# Patient Record
Sex: FEMALE | Race: ASIAN | NOT HISPANIC OR LATINO
[De-identification: names, ages, dates, MRNs, and addresses within clinical notes are randomized per-mention and may not be internally consistent; named-entity substitution may affect disease eponyms.]

---

## 2018-01-26 PROBLEM — Z00.00 ENCOUNTER FOR PREVENTIVE HEALTH EXAMINATION: Status: ACTIVE | Noted: 2018-01-26

## 2018-01-29 ENCOUNTER — APPOINTMENT (OUTPATIENT)
Dept: NEUROLOGY | Facility: CLINIC | Age: 53
End: 2018-01-29
Payer: COMMERCIAL

## 2018-01-29 PROCEDURE — 95909 NRV CNDJ TST 5-6 STUDIES: CPT

## 2019-10-02 ENCOUNTER — APPOINTMENT (OUTPATIENT)
Dept: SURGERY | Facility: CLINIC | Age: 54
End: 2019-10-02
Payer: COMMERCIAL

## 2019-10-02 PROCEDURE — 99205K: CUSTOM

## 2019-10-03 ENCOUNTER — RESULT REVIEW (OUTPATIENT)
Age: 54
End: 2019-10-03

## 2019-11-25 ENCOUNTER — APPOINTMENT (OUTPATIENT)
Dept: THORACIC SURGERY | Facility: CLINIC | Age: 54
End: 2019-11-25
Payer: COMMERCIAL

## 2019-11-25 VITALS
WEIGHT: 126 LBS | HEIGHT: 64 IN | BODY MASS INDEX: 21.51 KG/M2 | OXYGEN SATURATION: 97 % | HEART RATE: 70 BPM | SYSTOLIC BLOOD PRESSURE: 115 MMHG | RESPIRATION RATE: 17 BRPM | DIASTOLIC BLOOD PRESSURE: 80 MMHG

## 2019-11-25 DIAGNOSIS — Z85.3 PERSONAL HISTORY OF MALIGNANT NEOPLASM OF BREAST: ICD-10-CM

## 2019-11-25 PROCEDURE — 99205 OFFICE O/P NEW HI 60 MIN: CPT

## 2019-11-26 ENCOUNTER — OUTPATIENT (OUTPATIENT)
Dept: OUTPATIENT SERVICES | Facility: HOSPITAL | Age: 54
LOS: 1 days | End: 2019-11-26

## 2019-11-26 VITALS
RESPIRATION RATE: 16 BRPM | WEIGHT: 126.1 LBS | OXYGEN SATURATION: 98 % | HEART RATE: 77 BPM | DIASTOLIC BLOOD PRESSURE: 70 MMHG | HEIGHT: 64 IN | TEMPERATURE: 97 F | SYSTOLIC BLOOD PRESSURE: 106 MMHG

## 2019-11-26 DIAGNOSIS — R59.1 GENERALIZED ENLARGED LYMPH NODES: ICD-10-CM

## 2019-11-26 DIAGNOSIS — Z90.13 ACQUIRED ABSENCE OF BILATERAL BREASTS AND NIPPLES: Chronic | ICD-10-CM

## 2019-11-26 DIAGNOSIS — Z87.898 PERSONAL HISTORY OF OTHER SPECIFIED CONDITIONS: ICD-10-CM

## 2019-11-26 LAB
ALBUMIN SERPL ELPH-MCNC: 4.2 G/DL — SIGNIFICANT CHANGE UP (ref 3.3–5)
ALP SERPL-CCNC: 87 U/L — SIGNIFICANT CHANGE UP (ref 40–120)
ALT FLD-CCNC: 17 U/L — SIGNIFICANT CHANGE UP (ref 4–33)
ANION GAP SERPL CALC-SCNC: 11 MMO/L — SIGNIFICANT CHANGE UP (ref 7–14)
AST SERPL-CCNC: 17 U/L — SIGNIFICANT CHANGE UP (ref 4–32)
BILIRUB SERPL-MCNC: < 0.2 MG/DL — LOW (ref 0.2–1.2)
BLD GP AB SCN SERPL QL: NEGATIVE — SIGNIFICANT CHANGE UP
BUN SERPL-MCNC: 17 MG/DL — SIGNIFICANT CHANGE UP (ref 7–23)
CALCIUM SERPL-MCNC: 9.5 MG/DL — SIGNIFICANT CHANGE UP (ref 8.4–10.5)
CHLORIDE SERPL-SCNC: 99 MMOL/L — SIGNIFICANT CHANGE UP (ref 98–107)
CO2 SERPL-SCNC: 27 MMOL/L — SIGNIFICANT CHANGE UP (ref 22–31)
CREAT SERPL-MCNC: 0.52 MG/DL — SIGNIFICANT CHANGE UP (ref 0.5–1.3)
GLUCOSE SERPL-MCNC: 121 MG/DL — HIGH (ref 70–99)
HCT VFR BLD CALC: 36.3 % — SIGNIFICANT CHANGE UP (ref 34.5–45)
HGB BLD-MCNC: 11.8 G/DL — SIGNIFICANT CHANGE UP (ref 11.5–15.5)
MCHC RBC-ENTMCNC: 31.3 PG — SIGNIFICANT CHANGE UP (ref 27–34)
MCHC RBC-ENTMCNC: 32.5 % — SIGNIFICANT CHANGE UP (ref 32–36)
MCV RBC AUTO: 96.3 FL — SIGNIFICANT CHANGE UP (ref 80–100)
NRBC # FLD: 0 K/UL — SIGNIFICANT CHANGE UP (ref 0–0)
PLATELET # BLD AUTO: 341 K/UL — SIGNIFICANT CHANGE UP (ref 150–400)
PMV BLD: 9.4 FL — SIGNIFICANT CHANGE UP (ref 7–13)
POTASSIUM SERPL-MCNC: 3.7 MMOL/L — SIGNIFICANT CHANGE UP (ref 3.5–5.3)
POTASSIUM SERPL-SCNC: 3.7 MMOL/L — SIGNIFICANT CHANGE UP (ref 3.5–5.3)
PROT SERPL-MCNC: 7.5 G/DL — SIGNIFICANT CHANGE UP (ref 6–8.3)
RBC # BLD: 3.77 M/UL — LOW (ref 3.8–5.2)
RBC # FLD: 12.8 % — SIGNIFICANT CHANGE UP (ref 10.3–14.5)
RH IG SCN BLD-IMP: POSITIVE — SIGNIFICANT CHANGE UP
SODIUM SERPL-SCNC: 137 MMOL/L — SIGNIFICANT CHANGE UP (ref 135–145)
WBC # BLD: 7.72 K/UL — SIGNIFICANT CHANGE UP (ref 3.8–10.5)
WBC # FLD AUTO: 7.72 K/UL — SIGNIFICANT CHANGE UP (ref 3.8–10.5)

## 2019-11-26 RX ORDER — SODIUM CHLORIDE 9 MG/ML
1000 INJECTION, SOLUTION INTRAVENOUS
Refills: 0 | Status: DISCONTINUED | OUTPATIENT
Start: 2019-12-05 | End: 2020-01-05

## 2019-11-26 NOTE — H&P PST ADULT - NSANTHOSAYNRD_GEN_A_CORE
No. MARIANA screening performed.  STOP BANG Legend: 0-2 = LOW Risk; 3-4 = INTERMEDIATE Risk; 5-8 = HIGH Risk

## 2019-11-26 NOTE — H&P PST ADULT - NEUROLOGICAL DETAILS
normal strength/alert and oriented x 3/responds to pain/responds to verbal commands/sensation intact

## 2019-11-26 NOTE — H&P PST ADULT - ATTENDING COMMENTS
h/o breast cancer with pet positive R paratracheal LN, plan Flex bronch/med, and mediport placement per Dr. Serene Alegria from [facility].

## 2019-11-26 NOTE — ASSESSMENT
[FreeTextEntry1] : Ms. ADOLFO CHATMAN, 54 year old female, never smoker, w/ hx of bilateral breast CA s/p bilateral mastectomy with axillary LNs resection on 11/01/2019, path of the left mastectomy revealed [id number] and right mastectomy revealed [id number], referred to Hem/Onc Dr. Timoteo Jang for chemo/XRT, but PET scan found mediastinal lymphadenopathy.\par \par PET/CT on 11/14/2019:\par - 8 mm SUV=4.9 right anterior paratracheal region nodule (image 50)\par - hazy activity involving both breast regions and both anterolateral chest wall with SUV 3.7 corresponding to 8 mm thick soft tissue stranding or a node in the lateral right chest wall/axillary region peripheral to the 3rd rib(image 60)\par - large mostly fatty mass in the pelvis with a very small soft tissue nodular component, likely benign\par \par I have reviewed the patient's medical records and diagnostic images at time of this office consultation and have made the following recommendation:\par 1. PET/CT reviewed, I recommended a Flex Bronch bx, cervical mediastinoscopy on 12/05/2019. Risks and benefits and alternatives explained to patient, all questions answered, patient agreed to proceed with procedure. \par 2. PST\par \par \par Written by LAUREN BARRAZA NP and Chrissy Jay NP, acting as a scribe for Dr. Dalton Gomez.\par \par The documentation recorded by the scribe accurately reflects the service I personally performed and the decisions made by me. DALTON GOMEZ MD\par

## 2019-11-26 NOTE — H&P PST ADULT - NSICDXPROBLEM_GEN_ALL_CORE_FT
PROBLEM DIAGNOSES  Problem: H/O lymphadenopathy  Assessment and Plan: Pt scheduled for surgery and preop instructions including instructions for taking Famotidine on the day of surgery, given verbally and with use of  written materials, and patient confirming understanding of such instructions using  teach back method.  UCG and ABO ordered am DOS  OR Booking notified of NO IV/BP right arm

## 2019-11-26 NOTE — H&P PST ADULT - HISTORY OF PRESENT ILLNESS
Pt is a 54 yr old female scheduled for Flexible Bronchoscopy with Biopsy Cervical Mediastinoscopy with Dr Gomez 12/5/19 - pt s/p bilateral breast mastectomy with placement of expanders and found to have mediastinal enlarged lymph nodes. Pt has 2 drains in place still from surgery 11/1/19. Pt surgical scars healing well.

## 2019-11-26 NOTE — PHYSICAL EXAM
[General Appearance - Alert] : alert [General Appearance - In No Acute Distress] : in no acute distress [Sclera] : the sclera and conjunctiva were normal [PERRL With Normal Accommodation] : pupils were equal in size, round, and reactive to light [Extraocular Movements] : extraocular movements were intact [Outer Ear] : the ears and nose were normal in appearance [Oropharynx] : the oropharynx was normal [Neck Appearance] : the appearance of the neck was normal [Jugular Venous Distention Increased] : there was no jugular-venous distention [Neck Cervical Mass (___cm)] : no neck mass was observed [Thyroid Diffuse Enlargement] : the thyroid was not enlarged [Thyroid Nodule] : there were no palpable thyroid nodules [Auscultation Breath Sounds / Voice Sounds] : lungs were clear to auscultation bilaterally [Heart Sounds] : normal S1 and S2 [Heart Rate And Rhythm] : heart rate was normal and rhythm regular [Heart Sounds Gallop] : no gallops [Heart Sounds Pericardial Friction Rub] : no pericardial rub [Murmurs] : no murmurs [Chest Visual Inspection Thoracic Asymmetry] : no chest asymmetry [Diminished Respiratory Excursion] : normal chest expansion [2+] : left 2+ [No Abnormalities] : the abdominal aorta was not enlarged and no bruit was heard [Abdomen Soft] : soft [Bowel Sounds] : normal bowel sounds [Abdomen Mass (___ Cm)] : no abdominal mass palpated [Abdomen Tenderness] : non-tender [Cervical Lymph Nodes Enlarged Anterior Bilaterally] : anterior cervical [Cervical Lymph Nodes Enlarged Posterior Bilaterally] : posterior cervical [Supraclavicular Lymph Nodes Enlarged Bilaterally] : supraclavicular [No CVA Tenderness] : no ~M costovertebral angle tenderness [Abnormal Walk] : normal gait [No Spinal Tenderness] : no spinal tenderness [Nail Clubbing] : no clubbing  or cyanosis of the fingernails [Musculoskeletal - Swelling] : no joint swelling seen [Motor Tone] : muscle strength and tone were normal [Skin Color & Pigmentation] : normal skin color and pigmentation [Skin Turgor] : normal skin turgor [] : no rash [Deep Tendon Reflexes (DTR)] : deep tendon reflexes were 2+ and symmetric [Sensation] : the sensory exam was normal to light touch and pinprick [No Focal Deficits] : no focal deficits [Oriented To Time, Place, And Person] : oriented to person, place, and time [Impaired Insight] : insight and judgment were intact [Affect] : the affect was normal [Examination Of The Chest] : the chest was normal in appearance [Right Carotid Bruit] : no bruit heard over the right carotid [Left Carotid Bruit] : no bruit heard over the left carotid [Left Femoral Bruit] : no bruit heard over the left femoral artery [Right Femoral Bruit] : no bruit heard over the right femoral artery [FreeTextEntry1] : Deferred

## 2019-11-26 NOTE — CONSULT LETTER
[Dear  ___] : Dear  [unfilled], [Consult Letter:] : I had the pleasure of evaluating your patient, [unfilled]. [( Thank you for referring [unfilled] for consultation for _____ )] : Thank you for referring [unfilled] for consultation for [unfilled] [Please see my note below.] : Please see my note below. [Consult Closing:] : Thank you very much for allowing me to participate in the care of this patient.  If you have any questions, please do not hesitate to contact me. [Sincerely,] : Sincerely, [FreeTextEntry2] : Timoteo Jang MD (Hem/Onc/Referring)\par  [FreeTextEntry3] : Dalton Gomez MD, MPH \par System Director of Thoracic Surgery \par Director of Comprehensive Lung and Foregut [place] \par Professor Cardiovascular & Thoracic Surgery  \par [facility] [employer] at [facility]\par

## 2019-11-26 NOTE — H&P PST ADULT - NEGATIVE ENMT SYMPTOMS
no throat pain/no hearing difficulty/no sinus symptoms/no dysphagia/no ear pain/no tinnitus/no vertigo

## 2019-11-26 NOTE — HISTORY OF PRESENT ILLNESS
[FreeTextEntry1] : Ms. ADOLFO CHATMAN, 54 year old female, never smoker, w/ hx of bilateral breast CA s/p bilateral mastectomy with axillary LNs resection on 11/01/2019, path of the left mastectomy revealed [id number] and right mastectomy revealed [id number], referred to Hem/Onc Dr. Timoteo Jang for chemo/XRT, but PET scan found mediastinal lymphadenopathy.\par \par PET/CT on 11/14/2019:\par - 8 mm SUV=4.9 right anterior paratracheal region nodule (image 50)\par - hazy activity involving both breast regions and both anterolateral chest wall with SUV 3.7 corresponding to 8 mm thick soft tissue stranding or a node in the lateral right chest wall/axillary region peripheral to the 3rd rib(image 60)\par - large mostly fatty mass in the pelvis with a very small soft tissue nodular component, likely benign\par \par Patient is here today for CT Sx consultation, referred by Dr. Timoteo Jang. Patient denied CP, SOB, and cough. Patient s/p bilateral drainages. \par

## 2019-11-26 NOTE — H&P PST ADULT - SKIN/BREAST COMMENTS
Pt S/P bilateral mastectomy with expanders and drains in place - pt c/o of occasional pain with certain movements - surgical scars well healed

## 2019-11-26 NOTE — DATA REVIEWED
[FreeTextEntry1] : PET/CT on 11/14/2019:\par - 8 mm SUV=4.9 right anterior paratracheal region nodule (image 50)\par - hazy activity involving both breast regions and both anterolateral chest wall with SUV 3.7 corresponding to 8 mm thick soft tissue stranding or a node in the lateral right chest wall/axillary region peripheral to the 3rd rib(image 60)\par - large mostly fatty mass in the pelvis with a very small soft tissue nodular component, likely benign

## 2019-12-04 ENCOUNTER — TRANSCRIPTION ENCOUNTER (OUTPATIENT)
Age: 54
End: 2019-12-04

## 2019-12-04 ENCOUNTER — FORM ENCOUNTER (OUTPATIENT)
Age: 54
End: 2019-12-04

## 2019-12-05 ENCOUNTER — RESULT REVIEW (OUTPATIENT)
Age: 54
End: 2019-12-05

## 2019-12-05 ENCOUNTER — OUTPATIENT (OUTPATIENT)
Dept: OUTPATIENT SERVICES | Facility: HOSPITAL | Age: 54
LOS: 1 days | Discharge: HOME | End: 2019-12-05
Payer: COMMERCIAL

## 2019-12-05 ENCOUNTER — APPOINTMENT (OUTPATIENT)
Dept: THORACIC SURGERY | Facility: HOSPITAL | Age: 54
End: 2019-12-05

## 2019-12-05 VITALS
SYSTOLIC BLOOD PRESSURE: 108 MMHG | RESPIRATION RATE: 16 BRPM | OXYGEN SATURATION: 98 % | HEART RATE: 98 BPM | DIASTOLIC BLOOD PRESSURE: 61 MMHG

## 2019-12-05 VITALS
TEMPERATURE: 98 F | HEIGHT: 64 IN | HEART RATE: 77 BPM | SYSTOLIC BLOOD PRESSURE: 108 MMHG | DIASTOLIC BLOOD PRESSURE: 76 MMHG | RESPIRATION RATE: 16 BRPM | WEIGHT: 126.1 LBS | OXYGEN SATURATION: 97 %

## 2019-12-05 DIAGNOSIS — R59.1 GENERALIZED ENLARGED LYMPH NODES: ICD-10-CM

## 2019-12-05 DIAGNOSIS — Z90.13 ACQUIRED ABSENCE OF BILATERAL BREASTS AND NIPPLES: Chronic | ICD-10-CM

## 2019-12-05 LAB
HCG UR QL: NEGATIVE — SIGNIFICANT CHANGE UP
RH IG SCN BLD-IMP: POSITIVE — SIGNIFICANT CHANGE UP

## 2019-12-05 PROCEDURE — 31622 DX BRONCHOSCOPE/WASH: CPT

## 2019-12-05 PROCEDURE — 36561 INSERT TUNNELED CV CATH: CPT

## 2019-12-05 PROCEDURE — ZZZZZ: CPT

## 2019-12-05 PROCEDURE — 39402 MEDIASTINOSCPY W/LMPH NOD BX: CPT

## 2019-12-05 PROCEDURE — 77001 FLUOROGUIDE FOR VEIN DEVICE: CPT | Mod: 26,59

## 2019-12-05 PROCEDURE — 88305 TISSUE EXAM BY PATHOLOGIST: CPT | Mod: 26

## 2019-12-05 PROCEDURE — 88312 SPECIAL STAINS GROUP 1: CPT | Mod: 26

## 2019-12-05 PROCEDURE — 71045 X-RAY EXAM CHEST 1 VIEW: CPT | Mod: 26

## 2019-12-05 RX ORDER — ONDANSETRON 8 MG/1
4 TABLET, FILM COATED ORAL ONCE
Refills: 0 | Status: COMPLETED | OUTPATIENT
Start: 2019-12-05 | End: 2019-12-05

## 2019-12-05 RX ORDER — FENTANYL CITRATE 50 UG/ML
50 INJECTION INTRAVENOUS
Refills: 0 | Status: DISCONTINUED | OUTPATIENT
Start: 2019-12-05 | End: 2019-12-05

## 2019-12-05 RX ORDER — OXYCODONE HYDROCHLORIDE 5 MG/1
5 TABLET ORAL ONCE
Refills: 0 | Status: DISCONTINUED | OUTPATIENT
Start: 2019-12-05 | End: 2019-12-05

## 2019-12-05 RX ORDER — OXYCODONE HYDROCHLORIDE 5 MG/1
1 TABLET ORAL
Qty: 12 | Refills: 0
Start: 2019-12-05 | End: 2019-12-07

## 2019-12-05 RX ADMIN — SODIUM CHLORIDE 30 MILLILITER(S): 9 INJECTION, SOLUTION INTRAVENOUS at 07:22

## 2019-12-05 RX ADMIN — OXYCODONE HYDROCHLORIDE 5 MILLIGRAM(S): 5 TABLET ORAL at 11:00

## 2019-12-05 RX ADMIN — FENTANYL CITRATE 50 MICROGRAM(S): 50 INJECTION INTRAVENOUS at 11:00

## 2019-12-05 RX ADMIN — FENTANYL CITRATE 50 MICROGRAM(S): 50 INJECTION INTRAVENOUS at 11:40

## 2019-12-05 RX ADMIN — ONDANSETRON 4 MILLIGRAM(S): 8 TABLET, FILM COATED ORAL at 11:01

## 2019-12-05 RX ADMIN — OXYCODONE HYDROCHLORIDE 5 MILLIGRAM(S): 5 TABLET ORAL at 11:55

## 2019-12-05 NOTE — ASU DISCHARGE PLAN (ADULT/PEDIATRIC) - CALL YOUR DOCTOR IF YOU HAVE ANY OF THE FOLLOWING:
Fever greater than (need to indicate Fahrenheit or Celsius)/Bleeding that does not stop Numbness, tingling, color or temperature change to extremity/Fever greater than (need to indicate Fahrenheit or Celsius)/Inability to tolerate liquids or foods/Bleeding that does not stop/Wound/Surgical Site with redness, or foul smelling discharge or pus/Nausea and vomiting that does not stop/Unable to urinate

## 2019-12-05 NOTE — ASU DISCHARGE PLAN (ADULT/PEDIATRIC) - CARE PROVIDER_API CALL
Dalton Gomez (MD)  Surgery; Thoracic Surgery  [id number] [place] [address]  Phone: [phone]  Fax: [phone]  Follow Up Time:

## 2019-12-05 NOTE — BRIEF OPERATIVE NOTE - NSICDXBRIEFPROCEDURE_GEN_ALL_CORE_FT
PROCEDURES:  Bronchoscopy, with mediastinoscopy 05-Dec-2019 09:59:48  Forrest Calabrese  XR fluoro guided insertion of tunnelled central venous catheter 05-Dec-2019 09:59:19  Forrest Calabrese

## 2019-12-09 ENCOUNTER — APPOINTMENT (OUTPATIENT)
Dept: CARDIOLOGY | Facility: CLINIC | Age: 54
End: 2019-12-09
Payer: COMMERCIAL

## 2019-12-09 ENCOUNTER — NON-APPOINTMENT (OUTPATIENT)
Age: 54
End: 2019-12-09

## 2019-12-09 VITALS
RESPIRATION RATE: 17 BRPM | SYSTOLIC BLOOD PRESSURE: 102 MMHG | DIASTOLIC BLOOD PRESSURE: 70 MMHG | TEMPERATURE: 97.7 F | BODY MASS INDEX: 21.11 KG/M2 | WEIGHT: 123 LBS | OXYGEN SATURATION: 96 % | HEART RATE: 82 BPM

## 2019-12-09 DIAGNOSIS — Z51.11 ENCOUNTER FOR ANTINEOPLASTIC CHEMOTHERAPY: ICD-10-CM

## 2019-12-09 DIAGNOSIS — Z71.82 EXERCISE COUNSELING: ICD-10-CM

## 2019-12-09 PROBLEM — R59.0 LOCALIZED ENLARGED LYMPH NODES: Chronic | Status: ACTIVE | Noted: 2019-11-26

## 2019-12-09 PROBLEM — C50.919 MALIGNANT NEOPLASM OF UNSPECIFIED SITE OF UNSPECIFIED FEMALE BREAST: Chronic | Status: ACTIVE | Noted: 2019-11-26

## 2019-12-09 PROCEDURE — 93320 DOPPLER ECHO COMPLETE: CPT

## 2019-12-09 PROCEDURE — 93000 ELECTROCARDIOGRAM COMPLETE: CPT | Mod: 59

## 2019-12-09 PROCEDURE — 99202 OFFICE O/P NEW SF 15 MIN: CPT

## 2019-12-09 PROCEDURE — 93351 STRESS TTE COMPLETE: CPT

## 2019-12-09 PROCEDURE — 93325 DOPPLER ECHO COLOR FLOW MAPG: CPT

## 2019-12-09 NOTE — DISCUSSION/SUMMARY
[___ Month(s)] : [unfilled] month(s) [With Me] : with me [FreeTextEntry1] : Patient's visit can be concluded:\par 1. Patient has no active cardiac risk contradicting to the chemotherapy.\par 2. Patient has normal cardiovascular system.

## 2019-12-09 NOTE — PHYSICAL EXAM
[General Appearance - Well Developed] : well developed [Normal Appearance] : normal appearance [Well Groomed] : well groomed [General Appearance - Well Nourished] : well nourished [Normal Conjunctiva] : the conjunctiva exhibited no abnormalities [No Deformities] : no deformities [General Appearance - In No Acute Distress] : no acute distress [Normal Oral Mucosa] : normal oral mucosa [Eyelids - No Xanthelasma] : the eyelids demonstrated no xanthelasmas [No Oral Pallor] : no oral pallor [No Oral Cyanosis] : no oral cyanosis [Normal Jugular Venous A Waves Present] : normal jugular venous A waves present [Normal Jugular Venous V Waves Present] : normal jugular venous V waves present [Heart Rate And Rhythm] : heart rate and rhythm were normal [No Jugular Venous Boothe A Waves] : no jugular venous boothe A waves [Heart Sounds] : normal S1 and S2 [Murmurs] : no murmurs present [Edema] : no peripheral edema present [Veins - Varicosity Changes] : no varicosital changes were noted in the lower extremities [Arterial Pulses Normal] : the arterial pulses were normal [Respiration, Rhythm And Depth] : normal respiratory rhythm and effort [Exaggerated Use Of Accessory Muscles For Inspiration] : no accessory muscle use [Chest Palpation] : palpation of the chest revealed no abnormalities [Auscultation Breath Sounds / Voice Sounds] : lungs were clear to auscultation bilaterally [Bowel Sounds] : normal bowel sounds [Lungs Percussion] : the lungs were normal to percussion [Abdomen Soft] : soft [Abdomen Tenderness] : non-tender [Abnormal Walk] : normal gait [Abdomen Mass (___ Cm)] : no abdominal mass palpated [Abdomen Hernia] : no hernia was discovered [Gait - Sufficient For Exercise Testing] : the gait was sufficient for exercise testing [Nail Clubbing] : no clubbing of the fingernails [Cyanosis, Localized] : no localized cyanosis [Skin Turgor] : normal skin turgor [Petechial Hemorrhages (___cm)] : no petechial hemorrhages [] : no rash [No Venous Stasis] : no venous stasis [No Xanthoma] : no  xanthoma was observed [No Skin Ulcers] : no skin ulcer [Oriented To Time, Place, And Person] : oriented to person, place, and time [Affect] : the affect was normal [Impaired Insight] : insight and judgment were intact [Mood] : the mood was normal [Memory Remote] : remote memory was not impaired [Memory Recent] : recent memory was not impaired [No Anxiety] : not feeling anxious [FreeTextEntry1] : bandage over the upper sternum

## 2019-12-09 NOTE — REASON FOR VISIT
[Consultation] : a consultation regarding [FreeTextEntry1] : before chemotherapy, exercise counseling

## 2019-12-09 NOTE — REVIEW OF SYSTEMS
[Negative] : Heme/Lymph [Fever] : no fever [Headache] : no headache [Chills] : no chills [Blurry Vision] : no blurred vision [Feeling Fatigued] : not feeling fatigued [Seeing Double (Diplopia)] : no diplopia [Eye Pain] : no eye pain [Eyeglasses] : not currently wearing eyeglasses [Earache] : no earache [Loss Of Hearing] : no hearing loss [Discharge From The Ears] : no discharge from the ears [Mouth Sores] : no mouth sores [Sore Throat] : no sore throat [Sinus Pressure] : no sinus pressure [Shortness Of Breath] : no shortness of breath [Dyspnea on exertion] : not dyspnea during exertion [Chest  Pressure] : no chest pressure [Chest Pain] : no chest pain [Lower Ext Edema] : no extremity edema [Leg Claudication] : no intermittent leg claudication [Palpitations] : no palpitations [Cough] : no cough [Wheezing] : no wheezing [Coughing Up Blood] : no hemoptysis [Abdominal Pain] : no abdominal pain [Nausea] : no nausea [Vomiting] : no vomiting [Heartburn] : no heartburn [Change in Appetite] : no change in appetite [Change In The Stool] : no change in stool [Dysphagia] : no dysphagia [Dysuria] : no dysuria [Incontinence] : no incontinence [Pelvic Pain] : no pelvic pain [Dysmenorrhea] : no dysmenorrhea [Vaginal Discharge] : no vaginal discharge [Abn Vaginal Bleeding] : no unexplained vaginal bleeding [Joint Pain] : no joint pain [Joint Swelling] : no joint swelling [Joint Stiffness] : no joint stiffness [Muscle Cramps] : no muscle cramps [Limb Weakness (Paresis)] : no limb weakness [Skin: A Rash] : no rash: [Itching] : no itching [Change In Color Of Skin] : change in skin color [Skin Lesions] : no skin lesions [Dizziness] : no dizziness [Tremor] : no tremor was seen [Numbness (Hypesthesia)] : no numbness [Convulsions] : no convulsions [Tingling (Paresthesia)] : no tingling [Confusion] : no confusion was observed [Memory Lapses Or Loss] : no memory lapses or loss [Depression] : no depression [Anxiety] : no anxiety [Under Stress] : not under stress [Suicidal] : not suicidal [Easy Bleeding] : no tendency for easy bleeding [Excessive Thirst] : no polydipsia [Swollen Glands] : no swollen glands [Swollen Glands In The Neck] : no swollen glands in the neck [Easy Bruising] : no tendency for easy bruising

## 2019-12-09 NOTE — HISTORY OF PRESENT ILLNESS
[FreeTextEntry1] : Patient, a 54 year old female at s/p keshia mastectomy pending chemotherapy  is referred to me for the cardiac evaluation. She has been  in sedentary life style, but  been in " healthy state" without common disease as  diabetes mellitus, hypertension or known lipid abnormality. At ordinary condition, she has no chest pain, no shortness of breath, no dizziness or palpitations.\par She is interesting in exercise as well.

## 2019-12-17 PROBLEM — R59.1 LYMPHADENOPATHY: Status: ACTIVE | Noted: 2019-11-25

## 2019-12-19 ENCOUNTER — APPOINTMENT (OUTPATIENT)
Dept: THORACIC SURGERY | Facility: CLINIC | Age: 54
End: 2019-12-19
Payer: COMMERCIAL

## 2019-12-19 VITALS
HEART RATE: 73 BPM | HEIGHT: 64 IN | OXYGEN SATURATION: 98 % | DIASTOLIC BLOOD PRESSURE: 69 MMHG | WEIGHT: 125 LBS | TEMPERATURE: 98.1 F | BODY MASS INDEX: 21.34 KG/M2 | RESPIRATION RATE: 16 BRPM | SYSTOLIC BLOOD PRESSURE: 99 MMHG

## 2019-12-19 DIAGNOSIS — R59.1 GENERALIZED ENLARGED LYMPH NODES: ICD-10-CM

## 2019-12-19 PROCEDURE — 99213 OFFICE O/P EST LOW 20 MIN: CPT

## 2019-12-19 RX ORDER — CEFADROXIL 1000 MG/1
TABLET ORAL
Refills: 0 | Status: COMPLETED | COMMUNITY
End: 2019-12-19

## 2019-12-27 NOTE — HISTORY OF PRESENT ILLNESS
[FreeTextEntry1] : Ms. ADOLFO CHATAMN, 54 year old female, never smoker, w/ hx of bilateral breast CA s/p bilateral mastectomy with axillary LNs resection on 11/01/2019, path of the left mastectomy revealed [id number] and right mastectomy revealed [id number], referred to Hem/Onc Dr. Timoteo Jang for chemo/XRT, but PET scan found mediastinal lymphadenopathy.\par \par PET/CT on 11/14/2019:\par - 8 mm SUV=4.9 right anterior paratracheal region nodule (image 50)\par - hazy activity involving both breast regions and both anterolateral chest wall with SUV 3.7 corresponding to 8 mm thick soft tissue stranding or a node in the lateral right chest wall/axillary region peripheral to the 3rd rib(image 60)\par - large mostly fatty mass in the pelvis with a very small soft tissue nodular component, likely benign\par \par Now 2 weeks s/p FB. Cervical mediastinoscopy. Placement of left subclavian vein single lumen PowerPort on 12/05/2019. Path revealed (0/7) LNs negative for carcinoma, with non-necrotizing granulomata. AFB and GMS pending. \par \par Pt started chemotherapy with Dr. Serene Alegria 12/12/19, she is planned for 8 cycles.\par \par Patient is here today for a follow up.  Pt c/o dry cough but is otherwise feeling well.  She denies CP, SOB, fever, cough, palpitations or unintentional weight loss.\par

## 2019-12-27 NOTE — CONSULT LETTER
[Consult Letter:] : I had the pleasure of evaluating your patient, [unfilled]. [Dear  ___] : Dear  [unfilled], [( Thank you for referring [unfilled] for consultation for _____ )] : Thank you for referring [unfilled] for consultation for [unfilled] [Please see my note below.] : Please see my note below. [Sincerely,] : Sincerely, [Consult Closing:] : Thank you very much for allowing me to participate in the care of this patient.  If you have any questions, please do not hesitate to contact me. [DrBarb  ___] : Dr. BAGLEY [FreeTextEntry2] : Serene Alegria MD (Hem/Onc/Referring)\par Nadeen Abel MD (Breast surgeon) [FreeTextEntry3] : Dalton Gomez MD, MPH \par System Director of Thoracic Surgery \par Director of Comprehensive Lung and Foregut [place] \par Professor Cardiovascular & Thoracic Surgery  \par [facility] [employer] at [facility]\par

## 2019-12-27 NOTE — PHYSICAL EXAM
[Respiration, Rhythm And Depth] : normal respiratory rhythm and effort [Auscultation Breath Sounds / Voice Sounds] : lungs were clear to auscultation bilaterally [Heart Rate And Rhythm] : heart rate was normal and rhythm regular [Heart Sounds] : normal S1 and S2 [Examination Of The Chest] : the chest was normal in appearance [2+] : right 2+ [Bowel Sounds] : normal bowel sounds [Abdomen Soft] : soft [No CVA Tenderness] : no ~M costovertebral angle tenderness [Abdomen Tenderness] : non-tender [Abnormal Walk] : normal gait [Musculoskeletal - Swelling] : no joint swelling seen [Skin Color & Pigmentation] : normal skin color and pigmentation [Skin Turgor] : normal skin turgor [No Focal Deficits] : no focal deficits [] : no rash [Oriented To Time, Place, And Person] : oriented to person, place, and time [Impaired Insight] : insight and judgment were intact [Affect] : the affect was normal [FreeTextEntry1] : mediastinoscopy and left mediport incision healing well, no s/s infection, no drainage

## 2019-12-27 NOTE — ASSESSMENT
[FreeTextEntry1] : Ms. ADOLFO CHATMAN, 54 year old female, never smoker, w/ hx of bilateral breast CA s/p bilateral mastectomy with axillary LNs resection on 11/01/2019, path of the left mastectomy revealed [id number] and right mastectomy revealed [id number], referred to Hem/Onc Dr. Timoteo Jang for chemo/XRT, but PET scan found mediastinal lymphadenopathy.\par \par PET/CT on 11/14/2019:\par - 8 mm SUV=4.9 right anterior paratracheal region nodule (image 50)\par - hazy activity involving both breast regions and both anterolateral chest wall with SUV 3.7 corresponding to 8 mm thick soft tissue stranding or a node in the lateral right chest wall/axillary region peripheral to the 3rd rib(image 60)\par - large mostly fatty mass in the pelvis with a very small soft tissue nodular component, likely benign\par \par Now 2 weeks s/p FB. Cervical mediastinoscopy. Placement of left subclavian vein single lumen PowerPort on 12/05/2019. Path revealed (0/7) LNs negative for carcinoma, with non-necrotizing granulomata. AFB and GMS pending. \par \par I have reviewed the patient's medical records and diagnostic images at time of this office consultation and have made the following recommendation:\par 1. Pathology reviewed with patient, negative for malignancy, return to office PRN.\par 2. Referral given for patient to see Pulmonology Dr. Tyler Sorto or Dr. Melyssa Barrios.\par 3. Continue to follow up with Oncology.\par \par \par I personally performed the services described in the documentation, reviewed the documentation recorded by the scribe in my presence and it accurately and completely records my words and actions.\par \par I, Wing Marielena NP, am scribing for and the presence of RUMA Giles, the following sections HISTORY OF PRESENT ILLNESS, PAST MEDICAL/FAMILY/SOCIAL HISTORY; REVIEW OF SYSTEMS; VITAL SIGNS; PHYSICAL EXAM; DISPOSITION.\par \par \par \par \par

## 2020-01-10 NOTE — ASU PREOP CHECKLIST - SURGICAL CONSENT
H. pylori infection    Migraines    Type 2 diabetes mellitus without complication, without long-term current use of insulin
done

## 2020-02-24 ENCOUNTER — APPOINTMENT (OUTPATIENT)
Dept: OPHTHALMOLOGY | Facility: CLINIC | Age: 55
End: 2020-02-24

## 2020-09-12 ENCOUNTER — APPOINTMENT (OUTPATIENT)
Dept: DISASTER EMERGENCY | Facility: CLINIC | Age: 55
End: 2020-09-12

## 2020-09-12 DIAGNOSIS — Z01.818 ENCOUNTER FOR OTHER PREPROCEDURAL EXAMINATION: ICD-10-CM

## 2020-09-13 LAB — SARS-COV-2 N GENE NPH QL NAA+PROBE: NOT DETECTED

## 2020-09-15 ENCOUNTER — OUTPATIENT (OUTPATIENT)
Dept: OUTPATIENT SERVICES | Facility: HOSPITAL | Age: 55
LOS: 1 days | Discharge: HOME | End: 2020-09-15
Payer: COMMERCIAL

## 2020-09-15 ENCOUNTER — APPOINTMENT (OUTPATIENT)
Dept: THORACIC SURGERY | Facility: HOSPITAL | Age: 55
End: 2020-09-15

## 2020-09-15 VITALS
SYSTOLIC BLOOD PRESSURE: 112 MMHG | DIASTOLIC BLOOD PRESSURE: 67 MMHG | HEART RATE: 72 BPM | OXYGEN SATURATION: 98 % | RESPIRATION RATE: 15 BRPM

## 2020-09-15 VITALS
HEIGHT: 64 IN | WEIGHT: 126.1 LBS | SYSTOLIC BLOOD PRESSURE: 108 MMHG | HEART RATE: 83 BPM | DIASTOLIC BLOOD PRESSURE: 72 MMHG | OXYGEN SATURATION: 99 % | RESPIRATION RATE: 15 BRPM | TEMPERATURE: 98 F

## 2020-09-15 DIAGNOSIS — C50.919 MALIGNANT NEOPLASM OF UNSPECIFIED SITE OF UNSPECIFIED FEMALE BREAST: ICD-10-CM

## 2020-09-15 DIAGNOSIS — Z90.13 ACQUIRED ABSENCE OF BILATERAL BREASTS AND NIPPLES: Chronic | ICD-10-CM

## 2020-09-15 DIAGNOSIS — C34.90 MALIGNANT NEOPLASM OF UNSPECIFIED PART OF UNSPECIFIED BRONCHUS OR LUNG: ICD-10-CM

## 2020-09-15 LAB — HCG UR QL: NEGATIVE — SIGNIFICANT CHANGE UP

## 2020-09-15 PROCEDURE — 99223 1ST HOSP IP/OBS HIGH 75: CPT

## 2020-09-15 PROCEDURE — 36590 REMOVAL TUNNELED CV CATH: CPT

## 2020-09-15 PROCEDURE — 71045 X-RAY EXAM CHEST 1 VIEW: CPT | Mod: 26

## 2020-09-15 PROCEDURE — ZZZZZ: CPT

## 2020-09-15 RX ORDER — SODIUM CHLORIDE 9 MG/ML
1000 INJECTION, SOLUTION INTRAVENOUS
Refills: 0 | Status: DISCONTINUED | OUTPATIENT
Start: 2020-09-15 | End: 2020-09-29

## 2020-09-15 RX ORDER — OXYCODONE HYDROCHLORIDE 5 MG/1
5 TABLET ORAL ONCE
Refills: 0 | Status: DISCONTINUED | OUTPATIENT
Start: 2020-09-15 | End: 2020-09-15

## 2020-09-15 RX ORDER — HYDROMORPHONE HYDROCHLORIDE 2 MG/ML
0.5 INJECTION INTRAMUSCULAR; INTRAVENOUS; SUBCUTANEOUS
Refills: 0 | Status: DISCONTINUED | OUTPATIENT
Start: 2020-09-15 | End: 2020-09-15

## 2020-09-15 NOTE — ASU DISCHARGE PLAN (ADULT/PEDIATRIC) - CALL YOUR DOCTOR IF YOU HAVE ANY OF THE FOLLOWING:
Nausea and vomiting that does not stop/Pain not relieved by Medications/Unable to urinate/Bleeding that does not stop/Swelling that gets worse/Inability to tolerate liquids or foods/Fever greater than (need to indicate Fahrenheit or Celsius)/Wound/Surgical Site with redness, or foul smelling discharge or pus

## 2020-09-15 NOTE — ASU DISCHARGE PLAN (ADULT/PEDIATRIC) - NURSING INSTRUCTIONS
no advil/ibuprofen/motrin/nsaids until after 7:40pm tonight. Do not take pain medication on an empty stomach.  Increase fluids and fiber in diet to prevent constipation.

## 2020-09-15 NOTE — H&P ADULT - ASSESSMENT
Pt is a 54 yr old female with hx breast CA s/p B/L mastectomy s/p mediastinoscopy and mediport placement presents today for removal of mediport.   -IV insert  -for mediport removal

## 2020-09-15 NOTE — ASU DISCHARGE PLAN (ADULT/PEDIATRIC) - FOLLOW UP APPOINTMENTS
911 or go to the nearest Emergency Room [facility] (Adult):/may also call Recovery Room (PACU) 24/7 @ [phone]

## 2020-09-15 NOTE — ASU DISCHARGE PLAN (ADULT/PEDIATRIC) - CARE PROVIDER_API CALL
Dalton Gomez  SURGERY  [address] [place] [address] [place] [address]  Phone: [phone]  Fax: [phone]  Follow Up Time: 2 weeks

## 2020-09-15 NOTE — H&P ADULT - HISTORY OF PRESENT ILLNESS
Pt is a 54 yr old female with hx breast CA s/p B/L mastectomy s/p mediastinoscopy and mediport placement presents today for removal of mediport.

## 2021-04-18 NOTE — ASU PATIENT PROFILE, ADULT - INTERNATIONAL TRAVEL
Problem: Communication  Goal: The ability to communicate needs accurately and effectively will improve  Outcome: PROGRESSING AS EXPECTED     Problem: Safety  Goal: Will remain free from injury  Outcome: PROGRESSING AS EXPECTED     Problem: Infection  Goal: Will remain free from infection  Outcome: PROGRESSING AS EXPECTED     Problem: Pain Management  Goal: Pain level will decrease to patient's comfort goal  Outcome: PROGRESSING AS EXPECTED     Problem: Respiratory:  Goal: Respiratory status will improve  Outcome: PROGRESSING AS EXPECTED      No

## 2023-02-23 ENCOUNTER — APPOINTMENT (OUTPATIENT)
Dept: UROLOGY | Facility: CLINIC | Age: 58
End: 2023-02-23
Payer: COMMERCIAL

## 2023-02-23 VITALS
DIASTOLIC BLOOD PRESSURE: 67 MMHG | RESPIRATION RATE: 16 BRPM | OXYGEN SATURATION: 95 % | HEART RATE: 89 BPM | WEIGHT: 134 LBS | TEMPERATURE: 97.8 F | HEIGHT: 64 IN | SYSTOLIC BLOOD PRESSURE: 101 MMHG | BODY MASS INDEX: 22.88 KG/M2

## 2023-02-23 DIAGNOSIS — Z86.39 PERSONAL HISTORY OF OTHER ENDOCRINE, NUTRITIONAL AND METABOLIC DISEASE: ICD-10-CM

## 2023-02-23 PROCEDURE — 99204 OFFICE O/P NEW MOD 45 MIN: CPT

## 2023-02-23 RX ORDER — CIPROFLOXACIN HYDROCHLORIDE 500 MG/1
500 TABLET, FILM COATED ORAL
Refills: 0 | Status: ACTIVE | COMMUNITY

## 2023-02-23 RX ORDER — LEVOFLOXACIN 750 MG/1
TABLET, FILM COATED ORAL
Refills: 0 | Status: ACTIVE | COMMUNITY

## 2023-02-23 RX ORDER — LETROZOLE TABLETS 2.5 MG/1
TABLET, FILM COATED ORAL
Refills: 0 | Status: ACTIVE | COMMUNITY

## 2023-02-23 RX ORDER — AMOXICILLIN AND CLAVULANATE POTASSIUM 500; 125 MG/1; MG/1
500-125 TABLET, FILM COATED ORAL
Refills: 0 | Status: ACTIVE | COMMUNITY

## 2023-02-23 RX ORDER — LEVOTHYROXINE SODIUM 0.17 MG/1
TABLET ORAL
Refills: 0 | Status: ACTIVE | COMMUNITY

## 2023-02-24 NOTE — ASU PREOP CHECKLIST - HEIGHT IN CM
Therapy Activity Session  Performed by Rehab Aide staff     TEP: AcuteTherapy Extention Program, activity plan was established by a licensed therapist and performed under the guidance of a licensed therapist.      Pt seen on 9 nursing unit                                                                                         PT Frequency Frequency Comments: MWF 2/3-5 by 2/25 ANDREW L/ PEDRO                                                                                  OT Frequency Frequency Comments: X MTTh 3/3-5 by 2/26 (AIDE) (TEP daily reassess on 2/27)    SLP Frequency Frequency: MAB BID F VIDEO 1000 / sw [facility]    Availability:  Attempted to work with patient this date, unable to due to  Patient  now going to swallow study off floor/writer assisted PT session and will attempt TEP later in day.    Tolerance/Participation  Attempted, Not seen.     SESSION    Activities/Exercises/Mobility completed this session:  Physical Therapy Exercises    TEP Follow Up Needed: Yes  Therapy Extender Program Discipline: OT                                                                                 Occupational Therapy Exercises    TEP Follow Up Needed: Yes  Therapy Extender Program Discipline: OT        OT Frequency: MThF 3/3-5 by 2/19 (AIDE) TEP daily (Araceli N. [phone])    OT Task 1: BUE gentle PROM/AAROM in all directions (sh flex/ext, sh horiz abd/add, elbow flex/ext, forearm pro/sup, wrist flex/ext, grasp/release)  OT Reps for Task 1: 10  OT Sets for Task 1: 1          OT Task 2: BLE PROM ankle pumps, gentle assisted knee flexion/extension  OT Reps for Task 2: 10  OT Sets for Task 2: 1                              Speech Therapy Exercises    TEP Follow Up Needed: Yes                                                                        162.56

## 2023-02-27 ENCOUNTER — APPOINTMENT (OUTPATIENT)
Age: 58
End: 2023-02-27

## 2023-02-27 LAB
APPEARANCE: CLEAR
BACTERIA: NEGATIVE
BILIRUBIN URINE: NEGATIVE
BLOOD URINE: NEGATIVE
COLOR: NORMAL
GLUCOSE QUALITATIVE U: NEGATIVE
HYALINE CASTS: 0 /LPF
KETONES URINE: NEGATIVE
LEUKOCYTE ESTERASE URINE: NEGATIVE
MICROSCOPIC-UA: NORMAL
NITRITE URINE: NEGATIVE
PH URINE: 6
PROTEIN URINE: NEGATIVE
RED BLOOD CELLS URINE: 1 /HPF
SPECIFIC GRAVITY URINE: 1.01
SQUAMOUS EPITHELIAL CELLS: 0 /HPF
UROBILINOGEN URINE: NORMAL
WHITE BLOOD CELLS URINE: 0 /HPF

## 2023-02-28 NOTE — HISTORY OF PRESENT ILLNESS
[FreeTextEntry1] : Patient is a 58 yo F who presents for recurrent UTIs.\par She reports over past year she has developed more frequent UTIs - last year q3mos.\par But this year even more frequent.  Since 12/2022 she took cipro, levaquin and most recently augmentin.\par Since completing augmentin she feels back to normal.\par Her symptoms are increased frequency, dysuria.  No hematuria, fever/chills, flank pain.\par \par Denies  history of stones or other urologic conditions.\par Denies any sexual activity for past 4-5 yrs.

## 2023-02-28 NOTE — ASSESSMENT
[FreeTextEntry1] : Patient is 58 yo F who presents for recurrent UTIs.\par \par -Discussed with pt treatment options and evaluation for recurrent UTIs\par -D/w pt that typical work up includes renal sono and cystoscopy\par -D/w pt that cranberry pills have not been proven to have benefit, but certain may help particularly Ellura\par -D/w pt treatment options for recurrent UTIs, including probiotic use, improved personal hygiene methods, increased fluid intake ~>2L and also particularly after intercourse, methenamine, as well as antibiotics both hannah-coital or daily low dose prophylactic regimens. \par -D/w pt that there is also risk of increased UTI with letrozole\par -At this time, pt is hesitant about cysto\par -Will obtain renal US

## 2023-02-28 NOTE — ADDENDUM
[FreeTextEntry1] : Labs obtained from PCP\par Ucx with ESBL Ecoli in 2/3/2023 treated with 10 d ceftin

## 2023-03-23 ENCOUNTER — APPOINTMENT (OUTPATIENT)
Dept: UROLOGY | Facility: CLINIC | Age: 58
End: 2023-03-23
Payer: COMMERCIAL

## 2023-03-23 PROCEDURE — 99213 OFFICE O/P EST LOW 20 MIN: CPT

## 2023-03-23 PROCEDURE — 76775 US EXAM ABDO BACK WALL LIM: CPT

## 2023-03-23 NOTE — HISTORY OF PRESENT ILLNESS
[FreeTextEntry1] : Patient is a 58 yo F who presents for recurrent UTIs.\par She reports over past year she has developed more frequent UTIs - last year q3mos.\par But this year even more frequent.  Since 12/2022 she took cipro, levaquin and most recently ceftin.\par Since completing ceftin she feels back to normal.\par Her symptoms are increased frequency, dysuria.  No hematuria, fever/chills, flank pain.\par \par Denies  history of stones or other urologic conditions.\par Denies any sexual activity for past 4-5 yrs.\par \par Interval hx:\par Repeat urine culture - negative.\par Renal/bladder US today -- mild R >L pelvic fullness. \par Patient has had several PET/CT over the past few years, including one in 10/2022 at [facility] [place].  No hydro or renal abnormalities noted then or previously.

## 2023-03-23 NOTE — ASSESSMENT
[FreeTextEntry1] : Patient is a 58 yo F who presents for recurrent UTIs.\par \par Renal US in office today showing mild b/l R >L pelvic fullness.  [place] [facility] [place]par Plan for observation as suspect benign.\par Will repeat renal US in 6 mos\par F/u in 6 mos

## 2023-10-19 ENCOUNTER — APPOINTMENT (OUTPATIENT)
Dept: UROLOGY | Facility: CLINIC | Age: 58
End: 2023-10-19
Payer: COMMERCIAL

## 2023-10-19 DIAGNOSIS — N39.0 URINARY TRACT INFECTION, SITE NOT SPECIFIED: ICD-10-CM

## 2023-10-19 DIAGNOSIS — N13.30 UNSPECIFIED HYDRONEPHROSIS: ICD-10-CM

## 2023-10-19 PROCEDURE — 76775 US EXAM ABDO BACK WALL LIM: CPT

## 2023-10-19 PROCEDURE — 99213 OFFICE O/P EST LOW 20 MIN: CPT

## 2024-03-10 PROBLEM — Z71.82 EXERCISE COUNSELING: Status: ACTIVE | Noted: 2019-12-09

## 2024-05-30 NOTE — H&P PST ADULT - MUSCULOSKELETAL COMMENTS
Group Therapy Note    Date: 5/30/2024    Group Start Time:  9:00 AM  Group End Time:  9:40 AM  Group Topic: Community Meeting    [facility]    Sal Capellan LCSW        Group Therapy Note      The patients were encouraged to complete the morning assessment to identify goals, mood, and any safety concerns. The patients were encouraged to discuss goals set in the group with peers.     Attendees: 5       Patient's Goal:  Identify mood and goal setting exercise.    Notes:  The patient completed the morning assessment and denied SI/HI. The patient participated in goal setting exercise and assisted peers in setting goals for the day. The patient will continue to identify mood and set daily treatment goals.     Status After Intervention:  Unchanged    Participation Level: Active Listener and Interactive    Participation Quality: Appropriate, Attentive, Sharing, and Supportive      Speech:  normal      Thought Process/Content: Logical      Affective Functioning: Congruent      Mood: euthymic      Level of consciousness:  Alert, Oriented x4, and Attentive      Response to Learning: Able to verbalize current knowledge/experience, Capable of insight, and Progressing to goal      Endings: None Reported    Modes of Intervention: Support, Socialization, and Activity      Discipline Responsible: /Counselor      Signature:  Sal Capellan LCSW     Pt has mildly restricted ROM of upper arms r/t surgery

## 2024-10-23 NOTE — ASU PATIENT PROFILE, ADULT - HEALTHCARE INFORMATION NEEDED, PROFILE
right hepatic lobe nodule identified possibly corresponding with the hypoechoic nodule in the ultrasound.  This is an LR-3 lesion. No arterial enhancing nodules or masses identified.  07/2019.  Dynamic MRI of the abdomen.  Small area of progressive nodular enhancement in the inferior right hepatic lobe, which does not enhance in the arterial phase, but demonstrates enhancement in the portal venous phase without washout, is unchanged in appearance since 4/12/2019. There is persistent nodular enhancement on the equilibrium phase imaging. No washout. LR-3.  01/2020.  Dynamic MRI of the abdomen.  Hepatic steatosis with possible cirrhosis. Inferior right hepatic lobe nodule which demonstrates portal venous enhancement which persists is unchanged in size or appearance since prior MRI from 04/12/2019. LR-3. -No new developing hepatic finding.  07/2020.  Dynamic MRI of the abdomen.  Small lesion in the inferior right hepatic lobe is similar in size without highly suspicious features, LR-3.  01/2021.  Dynamic MRI of the abdomen.  Stable MR liver since prior exam 7-2020. Subcentimeter observation, segment VII subcapsular inferior right lobe liver, LI-RADS-3.  07/2021.  Dynamic MRI of the abdomen.  Small lesion in the inferior right hepatic lobe described previously is increasingly inconspicuous. LR-2.  No new worrisome liver lesions.  Liver mildly atrophied and lobulated consistent with cirrhosis.  01/2022.  Ultrasound of the liver.  Cirrhosis. Right hepatic lobe 2.0 x 1.4 cm hypoechoic lesion.  07/2022.  Ultrasound of the liver.  Coarsened internal echotexture with nodular hepatic contours. In the  right hepatic lobe, there is a 2.2 cm hypoechoic.  07/2022.  Dynamic MRI of the liver.  Cirrhosis with sequela of portal hypertension including splenomegaly,varices and ascites.  Subtle inferior right liver lesion described previously is increasingly inconspicuous. LR-2. No new suspicious liver lesions.  Nonocclusive portal 
none